# Patient Record
Sex: FEMALE | Race: WHITE | Employment: PART TIME | URBAN - METROPOLITAN AREA
[De-identification: names, ages, dates, MRNs, and addresses within clinical notes are randomized per-mention and may not be internally consistent; named-entity substitution may affect disease eponyms.]

---

## 2019-06-06 ENCOUNTER — HOSPITAL ENCOUNTER (OUTPATIENT)
Dept: BEHAVIORAL HEALTH | Facility: CLINIC | Age: 24
Discharge: HOME | End: 2019-06-06
Attending: SOCIAL WORKER | Admitting: SOCIAL WORKER
Payer: COMMERCIAL

## 2019-06-06 VITALS — DIASTOLIC BLOOD PRESSURE: 115 MMHG | SYSTOLIC BLOOD PRESSURE: 162 MMHG | HEART RATE: 83 BPM

## 2019-06-06 PROCEDURE — H0001 ALCOHOL AND/OR DRUG ASSESS: HCPCS

## 2019-06-06 ASSESSMENT — ANXIETY QUESTIONNAIRES
2. NOT BEING ABLE TO STOP OR CONTROL WORRYING: NEARLY EVERY DAY
GAD7 TOTAL SCORE: 21
7. FEELING AFRAID AS IF SOMETHING AWFUL MIGHT HAPPEN: NEARLY EVERY DAY
6. BECOMING EASILY ANNOYED OR IRRITABLE: NEARLY EVERY DAY
4. TROUBLE RELAXING: NEARLY EVERY DAY
5. BEING SO RESTLESS THAT IT IS HARD TO SIT STILL: NEARLY EVERY DAY
3. WORRYING TOO MUCH ABOUT DIFFERENT THINGS: NEARLY EVERY DAY
1. FEELING NERVOUS, ANXIOUS, OR ON EDGE: NEARLY EVERY DAY

## 2019-06-06 ASSESSMENT — PATIENT HEALTH QUESTIONNAIRE - PHQ9: SUM OF ALL RESPONSES TO PHQ QUESTIONS 1-9: 24

## 2019-06-06 NOTE — PROGRESS NOTES
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 12/2017. During that time of sobriety, she went to AA meetings, had a sponsor, great community of sober people, family, friends, hobbies, practiced 12 steps, outpatient and inpatient, therapy.     1B. What were the circumstances of your most recent relapse with mood altering chemicals?    She stopped going to meetings and talking with her sponsor. She started to get irritable and convinced herself that didn't need to keep going to AA. \"It's progressive.\"     2. Have you experienced cravings? If yes, ask follow up questions to determine if the person recognizes triggers and if the person has had any success in dealing with them.     - Cravings - Yes  - Triggers - her eating disorder.     3. Have you been treated for alcohol/other drug abuse/dependence?     [facility] - 3 times. First time 2015 and graduated. The second time she started outpatient, but went [facility]. Then about 2 years ago, [facility] told her to seek other treatment because she was using and not getting better. She liked [facility] because it gave her accountability, tools, and people to talk to.   [facility] - 01/2016 for 29 days. It was helpful and kickstarted keeping her sober.     4. Support group participation: Have you/do you attend support " group meetings to reduce/stop your alcohol/drug use? How recently? What was your experience? Are you willing to restart? If the person has not participated, is he or she willing?     Not been in a year. She stated she would go back.     5. What would assist you in staying sober/straight?     Additional supports.     Dimension V Ratings   Relapse/Continued Use/Continued problem potential - The placing authority must use the criteria in Dimension V to determine a client s relapse, continued use, and continued problem potential.   RISK DESCRIPTIONS - Severity ratin No awareness of the negative impact of mental health problems or substance abuse. No coping skills to arrest mental health or addiction illnesses, or prevent relapse.    REASONS SEVERITY WAS ASSIGNED - (What information did the person provide that indicates his or her understanding of relapse issues? What about the person s experience indicates how prone he or she is to relapse? What coping skills does the person have that decrease relapse potential?)      Patient reported four past treatments and no support group attendance. She reported having minimal sober time for the past 1.5 years. She has tried to quit using and drinking in the past but returned to use. Patient may have knowledge of the addiction cycle, use pattern, warning signs, and triggers, but she lacks impulse control, sober coping skills, and long-term sober maintenance skills. Patient is at a high risk for relapse/continued use. She has untreated co-occurring mental health and substance use issues, which places her at higher risk for continued use.     DIMENSION VI - Recovery Environment   1. Are you employed/attending school? Tell me about that.     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 Patient reported that use has negatively impacted her work. She knows she can do better work. She has also been super high during staff  "meetings with her mother.         2A. Describe a typical day; evening for you. Work, school, social, leisure, volunteer, spiritual practices. Include time spent obtaining, using, recovering from drugs or alcohol. (DSM)     Wake up at 6am, try to sleep, restless and sober, smoke or drink to go back to sleep. Get up between 11am and 1pm. Then do work. Then use. Go to bed at various times between 1am and 4am.     Please describe what leisure activities have been associated with your substance abuse:     She usually uses alone, but most of her friends use.     2B. How often do you spend more time than you planned using or use more than you planned? (DSM)     Each time. She's scared she'll take too much Robitussin now.     3. How important is using to your social connections? Do many of your family or friends use?     \"It's not really.\" She stated she wouldn't lose any friends, but it would feel different to her. Most friends use but are \"normies.\" She has not kept in touch with recovery friends.     4A. Are you currently in a significant relationship?     No - recently ended    4C. Sexual Orientation:     All    5A. Who do you live with?      She lives with her mother. Her brother just moved out.     5B. Tell me about their alcohol/drug use and mental health issues.     Her mother drinks and patient is getting worried about it. Her mom does not drink daily, but if she goes out, she has 4 drinks. Patient has smoked marijuana with her mom 2 or 3 times. Her mother has never bought it and patient is not concerned about her mother's marijuana use. Her mother has had a lot of trauma. She goes to therapy and has had dissociative issues.     5C. Are you concerned for your safety there? No    5D. Are you concerned about the safety of anyone else who lives with you? No    6A. Do you have children who live with you?     No    6B. Do you have children who do not live with you?     No    7A. Who supports you in making changes in " your alcohol or drug use? What are they willing to do to support you? Who is upset or angry about you making changes in your alcohol or drug use? How big a problem is this for you?      Mother.     7B. This table is provided to record information about the person s relationships and available support It is not necessary to ask each item; only to get a comprehensive picture of their support system.  How often can you count on the following people when you need someone?   Partner / Spouse The patient does not have a current partner or spouse.   Parent(s)/Aunt(s)/Uncle(s)/Grandparents Always supportive   Sibling(s)/Cousin(s) Usually supportive   Child(dave) The patient doesn't have any children.   Other relative(s) The patient doesn't have any current contact with other relatives.   Friend(s)/neighbor(s) Usually supportive   Child(dave) s father(s)/mother(s) The patient doesn't have any children.   Support group member(s) The patient denied having any current involvement with 12-step or other support group meetings.   Community of august members The patient denied having any current involvement with community august members.    /counselor/therapist/healer The patient denied having any current involvement with a , counselor, therapist or healer.   Other (specify) No     8A. What is your current living situation?     She lives with her mother.    8B. What is your long term plan for where you will be living?     No plans now, but wants her own apartment.     8C. Tell me about your living environment/neighborhood? Ask enough follow up questions to determine safety, criminal activity, availability of alcohol and drugs, supportive or antagonistic to the person making changes.      Patient did not indicate concerns in her neighborhood.     9. Criminal justice history: Gather current/recent history and any significant history related to substance use--Arrests? Convictions? Circumstances? Alcohol or drug  involvement? Sentences? Still on probation or parole? Expectations of the court? Current court order? Any sex offenses - lifetime? What level? (DSM)    Patient denied legal involvement.    10. What obstacles exist to participating in treatment? (Time off work, childcare, funding, transportation, pending [facility] time, living situation)     The patient denied having any obstacles for participating in substance abuse treatment.    Dimension VI Ratings   Recovery environment - The placing authority must use the criteria in Dimension VI to determine a client s recovery environment.   RISK DESCRIPTIONS - Severity ratin Client is engaged in structured, meaningful activity, but peers, family, significant other, and living environment are unsupportive, or there is criminal justice involvement by the client or among the client's peers, significant others, or in the client's living environment.    REASONS SEVERITY WAS ASSIGNED - (What support does the person have for making changes? What structure/stability does the person have in his or her daily life that will increase the likelihood that changes can be sustained? What problems exist in the person s environment that will jeopardize getting/staying clean and sober?)     Patient lives with her mother. Her current living situation is mostly supportive toward recovery, but patient's mother drinks. She reported having relationship conflict with her mother due to her ongoing substance use. She denied being in a significant relationship. She reported that most of her use of alcohol or drugs has been done alone. Patient lacks a current sober support network. She denied having any concerns regarding immediate living environment or neighborhood. Patient is employed part-time by her mother and works from home. She lacks a daily structure and meaningful activities. Patient denied legal involvement.     Client Choice/Exceptions   Would you like services specific to language, age,  gender, culture, Catholic preference, race, ethnicity, sexual orientation or disability?  No She doesn't want to quit using.     What particular treatment choices and options would you like to have? Unsure.     Do you have a preference for a particular treatment program? None, but she thinks [facility].     Criteria for Diagnosis     Criteria for Diagnosis  DSM-5 Criteria for Substance Use Disorder  Instructions: Determine whether the client currently meets the criteria for Substance Use Disorder using the diagnostic criteria in the DSM-V pp.[id number]. Current means during the most recent 12 months outside a facility that controls access to substances    Category of Substance Severity (ICD-10 Code / DSM 5 Code)     Alcohol Use Disorder Severe  (10.20) (303.90)   Cannabis Use Disorder Severe   (F12.20) (304.30)   Hallucinogen Use Disorder The patient does not meet the criteria for a Hallucinogen use disorder.   Inhalant Use Disorder The patient does not meet the criteria for an Inhalant use disorder.   Opioid Use Disorder The patient does not meet the criteria for an Opioid use disorder.   Sedative, Hypnotic, or Anxiolytic Use Disorder The patient does not meet the criteria for a Sedative/Hypnotic use disorder.   Stimulant Related Disorder Moderate   (F14.20) (304.20) Cocaine  Severe   (F15.20) (304.40) Amphetamine type substance   Tobacco Use Disorder Moderate   (F17.200) (305.1)   Other (or unknown) Substance Use Disorder Severe   (F19.20) (304.90) - Robitussin/Dextromethorphan     Collateral Contact Summary   Number of contacts made: 0  Contact with referring person:  No  If court related records were reviewed, summarize here: No court records had been reviewed at the time of this documentation.    Rule 25 Assessment Summary and Plan   's Recommendation    It is recommended that patient:  1). Participate in and complete the co-occurring lodging/residential substance use treatment program at  [facility] or [facility].   2). Follow all subsequent recommendations of the substance use treatment providers.   3). Abstain from alcohol and all mood-altering substances, except as prescribed. Take all medications as prescribed.   4). Attend AA at least three times weekly and obtain a female sponsor for additional sober supports. Consider attending [facility] to address effects of alcohol from family of origin.   5). Become involved in a daily sober recreational activity/hobby of her own interest.  6). Have a mental health evaluation to determine accurate diagnoses and which psychotropic medications would be effective.   7). Begin weekly individual mental health therapy. Work with primary counselor to address suicidal ideation and complete a Patient Safety Plan.     Collateral Contacts     Name:    Heather Rogers Relationship:    Friend Phone Number:    [phone] Releases:    Yes       Collateral Contacts     Name:    Zoey Silva Relationship:    Mother Phone Number:    [phone] Releases:    Yes     ateral Contact  Criteria for Diagnosis     A problematic pattern of alcohol/drug use leading to clinically significant impairment or distress, as manifested by at least two of the following, occurring within a 12-month period: 11/11    1.) Alcohol/drug is often taken in larger amounts or over a longer period than was intended.  2.) There is a persistent desire or unsuccessful efforts to cut down or control alcohol/drug use  3.) A great deal of time is spent in activities necessary to obtain alcohol, use alcohol, or recover from its effects.  4.) Craving, or a strong desire or urge to use alcohol/drug  5.) Recurrent alcohol/drug use resulting in a failure to fulfill major role obligations at work, school or home.  6.) Continued alcohol use despite having persistent or recurrent social or interpersonal problems caused or exacerbated by the effects of alcohol/drug.  7.) Important social, occupational, or  recreational activities are given up or reduced because of alcohol/drug use.  8.) Recurrent alcohol/drug use in situations in which it is physically hazardous.  9.) Alcohol/drug use is continued despite knowledge of having a persistent or recurrent physical or psychological problem that is likely to have been caused or exacerbated by alcohol.  10.) Tolerance, as defined by either of the following: A need for markedly increased amounts of alcohol/drug to achieve intoxication or desired effect.  11.) Withdrawal, as manifested by either of the following: The characteristic withdrawal syndrome for alcohol/drug (refer to Criteria A and B of the criteria set for alcohol/drug withdrawal).    Specify if: In early remission:  After full criteria for alcohol/drug use disorder were previously met, none of the criteria for alcohol/drug use disorder have been met for at least 3 months but for less than 12 months (with the exception that Criterion A4,  Craving or a strong desire or urge to use alcohol/drug  may be met).     In sustained remission:   After full criteria for alcohol use disorder were previously met, none of the criteria for alcohol/drug use disorder have been met at any time during a period of 12 months or longer (with the exception that Criterion A4,  Craving or strong desire or urge to use alcohol/drug  may be met).   Specify if:   This additional specifier is used if the individual is in an environment where access to alcohol is restricted.    Mild: Presence of 2-3 symptoms  Moderate: Presence of 4-5 symptoms  Severe: Presence of 6 or more symptoms
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
## 2019-06-07 ASSESSMENT — ANXIETY QUESTIONNAIRES: GAD7 TOTAL SCORE: 21

## 2019-08-07 ENCOUNTER — TELEPHONE (OUTPATIENT)
Dept: BEHAVIORAL HEALTH | Facility: CLINIC | Age: 24
End: 2019-08-07

## 2021-02-04 ENCOUNTER — OFFICE VISIT (OUTPATIENT)
Dept: OBGYN | Facility: CLINIC | Age: 26
End: 2021-02-04
Attending: ADVANCED PRACTICE MIDWIFE
Payer: COMMERCIAL

## 2021-02-04 VITALS
WEIGHT: 147.3 LBS | HEIGHT: 68 IN | DIASTOLIC BLOOD PRESSURE: 77 MMHG | SYSTOLIC BLOOD PRESSURE: 113 MMHG | BODY MASS INDEX: 22.32 KG/M2 | HEART RATE: 76 BPM

## 2021-02-04 DIAGNOSIS — R10.2 PELVIC PAIN IN FEMALE: Primary | ICD-10-CM

## 2021-02-04 PROBLEM — Z97.5 BREAKTHROUGH BLEEDING ON NEXPLANON: Status: ACTIVE | Noted: 2018-02-08

## 2021-02-04 PROBLEM — N92.1 BREAKTHROUGH BLEEDING ON NEXPLANON: Status: ACTIVE | Noted: 2018-02-08

## 2021-02-04 PROBLEM — Z97.5 NEXPLANON IN PLACE: Status: ACTIVE | Noted: 2018-02-08

## 2021-02-04 PROBLEM — A60.1 HERPES SIMPLEX INFECTION OF PERIANAL SKIN: Status: ACTIVE | Noted: 2019-05-30

## 2021-02-04 PROCEDURE — G0463 HOSPITAL OUTPT CLINIC VISIT: HCPCS

## 2021-02-04 PROCEDURE — 99203 OFFICE O/P NEW LOW 30 MIN: CPT | Performed by: ADVANCED PRACTICE MIDWIFE

## 2021-02-04 RX ORDER — LURASIDONE HYDROCHLORIDE 60 MG/1
60 TABLET, FILM COATED ORAL
COMMUNITY
Start: 2020-12-21 | End: 2021-12-21

## 2021-02-04 RX ORDER — PROPRANOLOL HYDROCHLORIDE 10 MG/1
TABLET ORAL
COMMUNITY
Start: 2020-10-22

## 2021-02-04 RX ORDER — TRAZODONE HYDROCHLORIDE 50 MG/1
TABLET, FILM COATED ORAL
COMMUNITY
Start: 2020-09-25

## 2021-02-04 RX ORDER — VALACYCLOVIR HYDROCHLORIDE 500 MG/1
500 TABLET, FILM COATED ORAL
COMMUNITY
Start: 2020-07-29

## 2021-02-04 ASSESSMENT — MIFFLIN-ST. JEOR: SCORE: 1471.65

## 2021-02-04 NOTE — LETTER
Date:February 11, 2021      Patient was self referred, no letter generated. Do not send.        [facility] Health Information

## 2021-02-04 NOTE — LETTER
2/4/2021       RE: Catherine Silva  [address]  [facility] [address]     Dear Colleague,    Thank you for referring your patient, Catherine Silva, to the [facility] [place] at [facility]. Please see a copy of my visit note below.    Subjective:  Patient is a 25 year old  female here to discuss painful intercourse.  She is here for a second opinion at the request of her partner for ongoing pelvic pain with intercourse.  Pain occurs with deep penetration, does not feel discomfort with initial insertion of penis during intercourse.    The patient states that the pain began about 6-7 months ago with this partner, who is new to her.  Pain is described as cramping, sharp pressure and ongoing after intercourse for several hours.  Pain is worse with certain positions, sometimes has to stop intercourse all together due to discomfort.  This is frustrating to her and her partner, as it has not been a problem in the past.      Patient has IUD in place, inserted 2.5 years ago.  Has had recent pelvic ultrasound and full STI panel noted in care everywhere.  Pelvic ultrasound indicates that IUD is appropriately positioned, no other abnormalities noted.  Patient has had no bleeding since the IUD has been in place, is happy with this method of contraception and is not concerned that IUD is the source of her discomfort.      Patient consents to bimanual exam today.    Objective:  VSS  Pelvic exam performed:  Normal female anatomy, no open lesions or sores on perineum.  Bimanual exam performed.  IUD strings palpated.  Upon touching cervix, patient is able to identify that is the same sensation she feels with intercourse, describes today as pressure and uncomfortable with exam.  No cervical motion tenderness, IUD no palpated in cervix.  No uterine tenderness with exam.  No appreciable masses noted in adnexa.    Assessment:  Pelvic pain with  intercourse    Plan:  1.  Discussed that cervix can be sensitive with intercourse, and could be related to positioning.  2.  Discussed dyspareunia and emotional/physical response to intercourse.  Patient has been discussing this with her therapist.  Feels supported by partner, but expresses that he is frustrated with her discomfort.  3.  Recommend evaluation with pelvic floor physical therapy, patient receptive to this.  Referral sent.  4.  Return to clinic if pain persists or does not resolve.  BERNARDO Duran CNM        Again, thank you for allowing me to participate in the care of your patient.      Sincerely,    BERNARDO Duran CNM

## 2021-02-07 NOTE — PROGRESS NOTES
Subjective:  Patient is a 25 year old  female here to discuss painful intercourse.  She is here for a second opinion at the request of her partner for ongoing pelvic pain with intercourse.  Pain occurs with deep penetration, does not feel discomfort with initial insertion of penis during intercourse.    The patient states that the pain began about 6-7 months ago with this partner, who is new to her.  Pain is described as cramping, sharp pressure and ongoing after intercourse for several hours.  Pain is worse with certain positions, sometimes has to stop intercourse all together due to discomfort.  This is frustrating to her and her partner, as it has not been a problem in the past.      Patient has IUD in place, inserted 2.5 years ago.  Has had recent pelvic ultrasound and full STI panel noted in care everywhere.  Pelvic ultrasound indicates that IUD is appropriately positioned, no other abnormalities noted.  Patient has had no bleeding since the IUD has been in place, is happy with this method of contraception and is not concerned that IUD is the source of her discomfort.      Patient consents to bimanual exam today.    Objective:  VSS  Pelvic exam performed:  Normal female anatomy, no open lesions or sores on perineum.  Bimanual exam performed.  IUD strings palpated.  Upon touching cervix, patient is able to identify that is the same sensation she feels with intercourse, describes today as pressure and uncomfortable with exam.  No cervical motion tenderness, IUD no palpated in cervix.  No uterine tenderness with exam.  No appreciable masses noted in adnexa.    Assessment:  Pelvic pain with intercourse    Plan:  1.  Discussed that cervix can be sensitive with intercourse, and could be related to positioning.  2.  Discussed dyspareunia and emotional/physical response to intercourse.  Patient has been discussing this with her therapist.  Feels supported by partner, but expresses that he is frustrated with her  discomfort.  3.  Recommend evaluation with pelvic floor physical therapy, patient receptive to this.  Referral sent.  4.  Return to clinic if pain persists or does not resolve.  BERNARDO DuranM

## 2021-03-26 ENCOUNTER — THERAPY VISIT (OUTPATIENT)
Dept: PHYSICAL THERAPY | Facility: CLINIC | Age: 26
End: 2021-03-26
Payer: COMMERCIAL

## 2021-03-26 DIAGNOSIS — M99.05 SOMATIC DYSFUNCTION OF PELVIS REGION: ICD-10-CM

## 2021-03-26 PROCEDURE — 97530 THERAPEUTIC ACTIVITIES: CPT | Mod: GP | Performed by: PHYSICAL THERAPIST

## 2021-03-26 PROCEDURE — 97161 PT EVAL LOW COMPLEX 20 MIN: CPT | Mod: GP | Performed by: PHYSICAL THERAPIST

## 2021-03-26 PROCEDURE — 97110 THERAPEUTIC EXERCISES: CPT | Mod: GP | Performed by: PHYSICAL THERAPIST

## 2021-03-26 NOTE — PROGRESS NOTES
Physical Therapy Initial Evaluation  Subjective:  The history is provided by the patient. No  was used.   Therapist Generated HPI Evaluation  Problem details: *name pronounced trini    HPI: Pain with intercourse started a few months ago. Pain occurs with deeper penetration. IUD has been checked, no concerns. GYN says she has a lower cervix. Pain has gotten worse over time. Is in mental health therapy, which has improved the problem.    Birth History: none    Bladder symptoms:  Leaking: denies  Urgency: yes, has been worse in the past  Frequency: 5-8x/day  Nocturia: denies  Fluid intake: 1 glass of water, 3 cups of coffee each day    Bowel symptoms  Leaking: none  Constipation: on occasion, does push/strain  Regular? More so now, affected by food  Hx of fissures, hemorrhoids: hemorrhoids    [place]: pain just with deep penetration, can tolerate finger/tampon, no difficulty with orgasm or external stimulation    Prolapse symptoms: heaviness in rectum    Pain: none besides internal pain    PMH/PSH: eating disorder recovery, previous chemical dependency (sober), no surgeries  Meds: see     Exercise: doesn't exercise often (ED recovery); likes to keep an active lifestyle    Occupation: yes--lots of sitting    Goals: pain-free intercourse  .         Type of problem:  Pelvic dysfunction.    This is a new condition.  Condition occurred with:  Insidious onset.  Where condition occurred: for unknown reasons.  Patient reports pain:  Vaginal.  and is intermittent.    Since onset symptoms are gradually improving.  Symptoms are exacerbated by intercourse        Restrictions due to condition include:  Working in normal job without restrictions.  Barriers include:  None as reported by patient.                        Objective:  System                                 Pelvic Dysfunction Evaluation:            Pelvic Clock Exam:  normal (discomfort with internal palpation of R  LA)                External Assessment:    Skin Condition:  Normal    Bearing Down/Coughing:  Rectocele  Tissue Symmetry:  Normal  Introitus:  Normal  Muscle Contraction/Perineal Mobility:  Elevation and urogential triangle descent  Internal Assessment:    Sensory Exam:  Normal  Contraction/Grade:  Fair squeeze, definite lift (3)  Accessory Muscle use-Abdominals:  Present        Additional History:    Number of Pregnancies: 0    Caffeine Consumption:  3 cups coffee/day                     General     ROS    Assessment/Plan:    Patient is a 25 year old female with pelvic complaints.    Patient has the following significant findings with corresponding treatment plan.                Diagnosis 1:  Somatic dysfunction of pelvis l  Pain -  manual therapy, self management, education and home program  Impaired muscle performance - neuro re-education and home program  Decreased function - therapeutic activities and home program    Therapy Evaluation Codes:   1) History comprised of:   Personal factors that impact the plan of care:      previous eating disorder.    Comorbidity factors that impact the plan of care are:      None.     Medications impacting care: High blood pressure and antipsychotic.  2) Examination of Body Systems comprised of:   Body structures and functions that impact the plan of care:      Pelvis.   Activity limitations that impact the plan of care are:      Pelvic Exam and [place].  3) Clinical presentation characteristics are:   Stable/Uncomplicated.  4) Decision-Making    Low complexity using standardized patient assessment instrument and/or measureable assessment of functional outcome.  Cumulative Therapy Evaluation is: Low complexity.    Previous and current functional limitations:  (See Goal Flow Sheet for this information)    Short term and Long term goals: (See Goal Flow Sheet for this information)     Communication ability:  Patient appears to be able to clearly communicate and understand  verbal and written communication and follow directions correctly.  Treatment Explanation - The following has been discussed with the patient:   RX ordered/plan of care  Anticipated outcomes  Possible risks and side effects  This patient would benefit from PT intervention to resume normal activities.   Rehab potential is excellent.    Frequency:  2 X a month, once daily  Duration:  for 3 months  Discharge Plan:  Achieve all LTG.  Independent in home treatment program.  Reach maximal therapeutic benefit.    Please refer to the daily flowsheet for treatment today, total treatment time and time spent performing 1:1 timed codes.

## 2021-03-30 NOTE — PROGRESS NOTES
Physical Therapy Initial Evaluation  Subjective:    Patient Health History           General health as reported by patient is good.  Pertinent medical history includes: chemical dependency, mental illness and smoking.   Red flags:  None as reported by patient.  Medical allergies: none.   Surgeries include:  None.    Current medications:  None.    Current occupation is .   Primary job tasks include:  Computer work.                                    Objective:  System    Physical Exam    General     ROS    Assessment/Plan:

## 2021-04-02 ENCOUNTER — THERAPY VISIT (OUTPATIENT)
Dept: PHYSICAL THERAPY | Facility: CLINIC | Age: 26
End: 2021-04-02
Payer: COMMERCIAL

## 2021-04-02 DIAGNOSIS — M99.05 SOMATIC DYSFUNCTION OF PELVIS REGION: Primary | ICD-10-CM

## 2021-04-02 PROCEDURE — 97140 MANUAL THERAPY 1/> REGIONS: CPT | Mod: GP | Performed by: PHYSICAL THERAPIST

## 2021-04-02 PROCEDURE — 97530 THERAPEUTIC ACTIVITIES: CPT | Mod: GP | Performed by: PHYSICAL THERAPIST

## 2021-06-10 ENCOUNTER — THERAPY VISIT (OUTPATIENT)
Dept: PHYSICAL THERAPY | Facility: CLINIC | Age: 26
End: 2021-06-10
Payer: COMMERCIAL

## 2021-06-10 DIAGNOSIS — M99.05 SOMATIC DYSFUNCTION OF PELVIS REGION: ICD-10-CM

## 2021-06-10 PROCEDURE — 97140 MANUAL THERAPY 1/> REGIONS: CPT | Mod: GP | Performed by: PHYSICAL THERAPIST

## 2021-06-10 PROCEDURE — 97110 THERAPEUTIC EXERCISES: CPT | Mod: GP | Performed by: PHYSICAL THERAPIST

## 2021-07-14 PROBLEM — M99.05 SOMATIC DYSFUNCTION OF PELVIS REGION: Status: RESOLVED | Noted: 2021-03-26 | Resolved: 2021-07-14

## 2021-07-14 NOTE — PROGRESS NOTES
Subjective:  HPI  Physical Exam                    Objective:  System    Physical Exam    General     ROS    Assessment/Plan:    DISCHARGE REPORT    Progress reporting period is from 3/26/2021 to 6/10/2021.       SUBJECTIVE  Subjective changes noted by patient:  .  Subjective: Pain is still variable.  Pain is worse with cramping.  Doesn't know if she will have pain until intimacy is started.  Would like to try the [place].      Current pain level is NA  .     Previous pain level was  NA  .   Changes in function:  Yes (See Goal flowsheet attached for changes in current functional level)  Adverse reaction to treatment or activity: None    OBJECTIVE  Changes noted in objective findings:  Yes,   Objective: Encouraged patient to purchase large dilator sheryl work iwth deep stretch if partner not willling to try [place].  Needs continued work with relaxation.  Better with 5 sec hold.  Demonstrated exteranl massage for pelvic floor.      ASSESSMENT/PLAN  Updated problem list and treatment plan: Diagnosis 1:  Pelvic pain  Pain -  manual therapy, self management, education and home program  Decreased ROM/flexibility - manual therapy, therapeutic exercise and home program  Decreased strength - therapeutic exercise, therapeutic activities and home program  Impaired muscle performance - biofeedback, neuro re-education and home program  Decreased function - therapeutic activities and home program  STG/LTGs have been met or progress has been made towards goals:  Yes (See Goal flow sheet completed today.)  Assessment of Progress: The patient's condition has potential to improve.  The patient has not returned to therapy. Current status is unknown.  Self Management Plans:  Patient has been instructed in a home treatment program.    [place] continues to require the following intervention to meet STG and LTG's:  Patient needs to continue to work on the home exercise program.      Recommendations:  Will discharge from PT.  Patient did not  show for her last visit.    Please refer to the daily flowsheet for treatment today, total treatment time and time spent performing 1:1 timed codes.